# Patient Record
Sex: MALE | Race: BLACK OR AFRICAN AMERICAN | NOT HISPANIC OR LATINO | Employment: FULL TIME | URBAN - METROPOLITAN AREA
[De-identification: names, ages, dates, MRNs, and addresses within clinical notes are randomized per-mention and may not be internally consistent; named-entity substitution may affect disease eponyms.]

---

## 2017-02-12 ENCOUNTER — HOSPITAL ENCOUNTER (EMERGENCY)
Facility: HOSPITAL | Age: 47
Discharge: HOME | End: 2017-02-12
Attending: EMERGENCY MEDICINE
Payer: COMMERCIAL

## 2017-02-12 VITALS
SYSTOLIC BLOOD PRESSURE: 154 MMHG | RESPIRATION RATE: 12 BRPM | WEIGHT: 260 LBS | TEMPERATURE: 99 F | DIASTOLIC BLOOD PRESSURE: 77 MMHG | HEIGHT: 72 IN | BODY MASS INDEX: 35.21 KG/M2 | HEART RATE: 67 BPM | OXYGEN SATURATION: 96 %

## 2017-02-12 DIAGNOSIS — R07.9 CHEST PAIN, UNSPECIFIED TYPE: Primary | ICD-10-CM

## 2017-02-12 LAB
ANION GAP SERPL CALC-SCNC: 10 MMOL/L
BASOPHILS # BLD AUTO: 0.1 K/UL
BASOPHILS NFR BLD: 1.1 %
BUN SERPL-MCNC: 12 MG/DL
CALCIUM SERPL-MCNC: 9.9 MG/DL
CHLORIDE SERPL-SCNC: 99 MMOL/L
CO2 SERPL-SCNC: 27 MMOL/L
CREAT SERPL-MCNC: 1.3 MG/DL
DIFFERENTIAL METHOD: ABNORMAL
EOSINOPHIL # BLD AUTO: 0.1 K/UL
EOSINOPHIL NFR BLD: 1.6 %
ERYTHROCYTE [DISTWIDTH] IN BLOOD BY AUTOMATED COUNT: 13.4 %
EST. GFR  (AFRICAN AMERICAN): >60 ML/MIN/1.73 M^2
EST. GFR  (NON AFRICAN AMERICAN): >60 ML/MIN/1.73 M^2
GLUCOSE SERPL-MCNC: 115 MG/DL
HCT VFR BLD AUTO: 46.4 %
HGB BLD-MCNC: 15.2 G/DL
LYMPHOCYTES # BLD AUTO: 1.7 K/UL
LYMPHOCYTES NFR BLD: 31.3 %
MCH RBC QN AUTO: 25.6 PG
MCHC RBC AUTO-ENTMCNC: 32.8 %
MCV RBC AUTO: 78 FL
MONOCYTES # BLD AUTO: 0.3 K/UL
MONOCYTES NFR BLD: 4.7 %
NEUTROPHILS # BLD AUTO: 3.4 K/UL
NEUTROPHILS NFR BLD: 61.3 %
PLATELET # BLD AUTO: 213 K/UL
PMV BLD AUTO: 8.3 FL
POTASSIUM SERPL-SCNC: 3.7 MMOL/L
RBC # BLD AUTO: 5.94 M/UL
SODIUM SERPL-SCNC: 136 MMOL/L
TROPONIN I SERPL DL<=0.01 NG/ML-MCNC: 0.01 NG/ML
WBC # BLD AUTO: 5.6 K/UL

## 2017-02-12 PROCEDURE — 80048 BASIC METABOLIC PNL TOTAL CA: CPT

## 2017-02-12 PROCEDURE — 93005 ELECTROCARDIOGRAM TRACING: CPT

## 2017-02-12 PROCEDURE — 84484 ASSAY OF TROPONIN QUANT: CPT

## 2017-02-12 PROCEDURE — 85025 COMPLETE CBC W/AUTO DIFF WBC: CPT

## 2017-02-12 PROCEDURE — 99284 EMERGENCY DEPT VISIT MOD MDM: CPT

## 2017-02-12 PROCEDURE — 36415 COLL VENOUS BLD VENIPUNCTURE: CPT

## 2017-02-12 NOTE — ED PROVIDER NOTES
Encounter Date: 2/12/2017    SCRIBE #1 NOTE: IJahaira, am scribing for, and in the presence of, Dr. Hernandez.       History     Chief Complaint   Patient presents with    Chest Pain     constant dull pain ant. chest. Denies nausea, vomiting     Review of patient's allergies indicates:  No Known Allergies  HPI Comments: 2/12/2017  4:21 PM     Chief Complaint: Chest pain    The patient is a 46 y.o. male with PMHx of HTN and HLD who presents with chest pain. Patient c/o dull pain to the left chest which started suddenly 4 hours ago while driving from drill in [place]. Pt reports his non radiating pain has been non stop since onset. No alleviating or aggravating factors. No exertion of activity. Pt also reports short episode of heart flutter yesterday evening which resolved on its own. No pain at that time. No diaphoresis, nausea, vomiting, abdominal pain, leg pain, leg swelling, sob or difficulty breathing. Pt reports he had a prior episode a few years ago of heart flutter which resolved on its own. No hx of cardiac disease. No hx of broken bones or blood clots. Shx of knee scope.    The history is provided by the patient.     Past Medical History   Diagnosis Date    Hypertension      No past medical history pertinent negatives.  Past Surgical History   Procedure Laterality Date    Knee scope       Family History   Problem Relation Age of Onset    Diabetes Mother     Stroke Mother     Hypertension Father     Stroke Father     Alzheimer's disease Father      Social History   Substance Use Topics    Smoking status: Never Smoker    Smokeless tobacco: Never Used    Alcohol use Yes      Comment: occasional     Review of Systems   Constitutional: Negative for appetite change, chills, diaphoresis and fever.   HENT: Negative for congestion, rhinorrhea and sore throat.    Respiratory: Negative for cough and shortness of breath.    Cardiovascular: Positive for chest pain. Negative for leg swelling.         + Heart flutter   Gastrointestinal: Negative for abdominal pain, diarrhea, nausea and vomiting.   Genitourinary: Negative for dysuria.   Musculoskeletal: Negative for back pain and myalgias.   Skin: Negative for rash.   Neurological: Negative for weakness and numbness.   Hematological: Does not bruise/bleed easily.   All other systems reviewed and are negative.      Physical Exam   Initial Vitals   BP Pulse Resp Temp SpO2   02/12/17 1415 02/12/17 1415 02/12/17 1415 02/12/17 1415 02/12/17 1415   139/90 92 12 98.5 °F (36.9 °C) 96 %     Physical Exam    Nursing note and vitals reviewed.  Constitutional: No distress.   HENT:   Head: Normocephalic and atraumatic.   Mouth/Throat: Oropharynx is clear and moist and mucous membranes are normal.   Eyes: EOM are normal. Pupils are equal, round, and reactive to light.   Neck: Normal range of motion. Neck supple.   Cardiovascular: Normal rate, regular rhythm, normal heart sounds and intact distal pulses. Exam reveals no gallop and no friction rub.    No murmur heard.  Pulmonary/Chest: Breath sounds normal. He has no wheezes. He has no rhonchi. He has no rales.   Abdominal: Soft. He exhibits no distension. There is no tenderness.   Musculoskeletal: Normal range of motion. He exhibits no edema.   Neurological: He is alert and oriented to person, place, and time. He has normal strength.   Skin: Skin is dry. No rash noted. No erythema.   Psychiatric: He has a normal mood and affect.         ED Course   Procedures  Labs Reviewed   CBC W/ AUTO DIFFERENTIAL - Abnormal; Notable for the following:        Result Value    MCV 78 (*)     MCH 25.6 (*)     MPV 8.3 (*)     All other components within normal limits   BASIC METABOLIC PANEL - Abnormal; Notable for the following:     Glucose 115 (*)     All other components within normal limits   TROPONIN I                 Imaging Results         X-Ray Chest 1 View (Final result) Result time:  02/12/17 17:00:18    Final result by Manish WEI  MD Dominik (02/12/17 17:00:18)    Impression:     Negative chest.      Electronically signed by: Manish Lehman MD  Date:     02/12/17  Time:    17:00     Narrative:    AP chest without comparison    Findings: The cardiomediastinal silhouette is within normal limits.  Lungs are well-expanded and clear.            (radiology reading, visualized by me)          Scribe Attestation:   Scribe #1: I performed the above scribed service and the documentation accurately describes the services I performed. I attest to the accuracy of the note.    Attending Attestation:           Physician Attestation for Scribe:  Physician Attestation Statement for Scribe #1: I, Dr. Hernandez, reviewed documentation, as scribed by Jahaira Husain in my presence, and it is both accurate and complete.         German Hagen is a 46 y.o. male presenting with chest pain.  EKG shows no acute ischemic findings compared to prior with troponin I evaluation multiple hours after onset of symptoms showing no sign of ACS.  I do not think serial troponin evaluation is necessary.  Very low suspicion for other life-threatening etiologies such as PE or aortic dissection.    The patient is low risk for MI/ACS based on their history and available testing here. We have discussed the low but not zero rate of missed MI with further possible complications of cardiac arrhythmia, cardiac arrest, and death.  The patient was able to accurately repeat these back to me in their own words.  I did offer hospital admission, but they wish to go home with close outpatient cardiology follow up.  I feel this is reasonable.  They will be discharged to follow up with cardiology in the next 2-3 days with further provocative testing at cardiology discretion.  Return precautions were reviewed.          ED Course   Comment By Time   EKG:  NSR with sinus arrhythmia, benitez of 80, normal intervals, L axis.  Old inverted T-waves in V4-6 old compared to last prior 2015.  There  are no acute ST or T wave changes suggestive of acute ischemia or infarction.   Daniel Hernandez MD 02/12 0569     Clinical Impression:   The encounter diagnosis was Chest pain, unspecified type.          Daniel Hernandez MD  02/12/17 [id number]

## 2017-02-12 NOTE — ED AVS SNAPSHOT
[facility] [address]  [place] [address]               German Hagen   2017  4:16 PM   ED    Description:  Male : 1970   Department:  [facility]           Your Care was Coordinated By:     Provider Role From To    Daniel Hernandez MD Attending Provider 17 0956 --      Reason for Visit     Chest Pain           Diagnoses this Visit        Comments    Chest pain, unspecified type    -  Primary       ED Disposition     ED Disposition Condition Comment    Discharge             To Do List           Follow-up Information     Follow up with Bandar Jade MD.    Specialties:  Family Medicine, Internal Medicine    Why:  2-3 days    Contact information:    [address]  [place] [address]  [phone]        [facility]     [facility] -  Assistance  Registered nurses in the [facility] provide clinical advisement, health education, appointment booking, and other advisory services.  Call for this free service at [phone].             Medications           Message regarding Medications     Verify the changes and/or additions to your medication regime listed below are the same as discussed with your clinician today.  If any of these changes or additions are incorrect, please notify your healthcare provider.             Verify that the below list of medications is an accurate representation of the medications you are currently taking.  If none reported, the list may be blank. If incorrect, please contact your healthcare provider. Carry this list with you in case of emergency.           Current Medications     amlodipine (NORVASC) 10 MG tablet Take 1 tablet (10 mg total) by mouth once daily.    atorvastatin (LIPITOR) 40 MG tablet Take 1 tablet (40 mg total) by mouth once daily.    hydrochlorothiazide (HYDRODIURIL) 12.5 MG Tab TAKE 1 TABLET BY MOUTH EVERY DAY    lisinopril  (PRINIVIL,ZESTRIL) 40 MG tablet Take 1 tablet (40 mg total) by mouth once daily.    sildenafil (VIAGRA) 100 MG tablet Take 1 tablet (100 mg total) by mouth daily as needed for Erectile Dysfunction.           Clinical Reference Information           Your Vitals Were     BP Pulse Temp Resp Height Weight    154/77 67 98.5 °F (36.9 °C) (Oral) 12 6' (1.829 m) 117.9 kg (260 lb)    SpO2 BMI             96% 35.26 kg/m2         Allergies as of 2/12/2017     No Known Allergies      Immunizations Administered on Date of Encounter - 2/12/2017     None      ED Micro, Lab, POCT     Start Ordered       Status Ordering Provider    02/12/17 1631 02/12/17 1630  CBC auto differential  STAT      Final result     02/12/17 1631 02/12/17 1630  Basic metabolic panel  STAT      Final result     02/12/17 1631 02/12/17 1630  Troponin I  STAT      Final result       ED Imaging Orders     Start Ordered       Status Ordering Provider    02/12/17 1631 02/12/17 1630  X-Ray Chest 1 View  1 time imaging      Final result       Discharge References/Attachments     CHEST PAIN, UNCERTAIN CAUSE (ENGLISH)       [facility] complies with applicable Federal civil rights laws and does not discriminate on the basis of race, color, national origin, age, disability, or sex.        Language Assistance Services     ATTENTION: Language assistance services are available, free of charge. Please call [phone].      ATENCIÓN: Si habla español, tiene a lagunas disposición servicios gratuitos de asistencia lingüística. Llame al [phone].     CHÚ Ý: N?u b?n nói Ti?ng Vi?t, có các d?ch v? h? tr? ngôn ng? mi?n phí dành cho b?n. G?i s? [phone].

## 2021-07-01 ENCOUNTER — PATIENT MESSAGE (OUTPATIENT)
Dept: ADMINISTRATIVE | Facility: OTHER | Age: 51
End: 2021-07-01